# Patient Record
Sex: FEMALE | Race: WHITE | HISPANIC OR LATINO | Employment: FULL TIME | URBAN - METROPOLITAN AREA
[De-identification: names, ages, dates, MRNs, and addresses within clinical notes are randomized per-mention and may not be internally consistent; named-entity substitution may affect disease eponyms.]

---

## 2021-02-04 ENCOUNTER — OFFICE VISIT (OUTPATIENT)
Dept: FAMILY MEDICINE CLINIC | Facility: CLINIC | Age: 31
End: 2021-02-04

## 2021-02-04 VITALS
RESPIRATION RATE: 14 BRPM | DIASTOLIC BLOOD PRESSURE: 70 MMHG | OXYGEN SATURATION: 99 % | WEIGHT: 135 LBS | SYSTOLIC BLOOD PRESSURE: 122 MMHG | BODY MASS INDEX: 24.84 KG/M2 | HEART RATE: 63 BPM | HEIGHT: 62 IN

## 2021-02-04 DIAGNOSIS — N64.89 BILATERAL PENDULOUS BREASTS: Primary | ICD-10-CM

## 2021-02-04 PROCEDURE — 99203 OFFICE O/P NEW LOW 30 MIN: CPT | Performed by: NURSE PRACTITIONER

## 2021-02-04 NOTE — PATIENT INSTRUCTIONS
Breast Reduction  Breast reduction, also called reduction mammoplasty, is surgery to reduce the size of the breasts by removing fat, tissue, and excess skin. The goal of this procedure is to help relieve problems that are caused or made worse by large breasts, such as:  · Poor posture.  · Long-term (chronic) back and neck pain.  · Difficulty exercising.  · A rash on the skin under the breasts.  · Breast pain.  · Grooves in the shoulder from bra straps.  · Difficulty with hygiene.  · Psychological distress caused by large breasts.  Before the procedure, you and your surgeon will decide on a plan for the new size of your breasts.  Tell a health care provider about:  · Any allergies you have.  · All medicines you are taking, including vitamins, herbs, eye drops, creams, and over-the-counter medicines.  · Any problems you or family members have had with anesthetic medicines.  · Any blood disorders you have.  · Any surgeries you have had.  · Any medical conditions you have.  · Whether you are pregnant or may be pregnant.  What are the risks?  Generally, this is a safe procedure. However, problems may occur, including:  · Infection.  · Bleeding. Blood may pool near the incision area (hematoma).  · Allergic reactions to medicines.  · Damage to nearby structures or organs, such as the dark area around the nipple (areola).  · Partial or total numbness in the nipple or breast. Sometimes feeling returns, but not always.  · Inability to breastfeed.  · Pneumonia.  · Blood clots.  What happens before the procedure?  Staying hydrated  Follow instructions from your health care provider about hydration, which may include:  · Up to 2 hours before the procedure - you may continue to drink clear liquids, such as water, clear fruit juice, black coffee, and plain tea.    Eating and drinking restrictions  Follow instructions from your health care provider about eating and drinking, which may include:  · 8 hours before the procedure - stop  eating heavy meals or foods, such as meat, fried foods, or fatty foods.  · 6 hours before the procedure - stop eating light meals or foods, such as toast or cereal.  · 6 hours before the procedure - stop drinking milk or drinks that contain milk.  · 2 hours before the procedure - stop drinking clear liquids.  Medicines  Ask your health care provider about:  · Changing or stopping your regular medicines. This is especially important if you are taking diabetes medicines or blood thinners.  · Taking medicines such as aspirin and ibuprofen. These medicines can thin your blood. Do not take these medicines unless your health care provider tells you to take them.  · Taking other over-the-counter medicines, vitamins, herbs, and supplements.  Tests  You may have tests or exams, such as:  · Blood tests.  · An X-ray exam of the breasts (mammogram).  General instructions  · Ask your health care provider:  ? How your surgery site will be marked.  ? What steps will be taken to help prevent infection. These may include:  § Washing skin with a germ-killing soap.  § Taking antibiotic medicine.  · You may be asked to bathe using a germ-killing soap before your procedure.  · Do not use any products that contain nicotine or tobacco for at least 4 weeks before the procedure. These products include cigarettes, e-cigarettes, and chewing tobacco. If you need help quitting, ask your health care provider.  · Plan to have someone take you home from the hospital or clinic.  · Plan to have a responsible adult care for you for at least 24 hours after you leave the hospital or clinic. This is important.  What happens during the procedure?  · An IV will be inserted into one of your veins.  · You will be given one or more of the following:  ? A medicine to help you relax (sedative).  ? A medicine to numb the area (local anesthetic).  ? A medicine to make you fall asleep (general anesthetic).  · An incision will be made in each breast.  · Fat,  tissue, and excess skin will be removed from each breast.  · Your breasts will be reshaped. Your nipples may be moved so they are centered on your smaller breasts.  · Tubes may be placed in your breast tissue to drain fluid from your surgical area.  · Your incisions will be closed with stitches (sutures) and covered with surgical tape. Your breasts will be covered with gauze and elastic bandages (dressings).  The procedure may vary among health care providers and hospitals.  What happens after the procedure?  · Your blood pressure, heart rate, breathing rate, and blood oxygen level will be monitored until you leave the hospital or clinic.  · You may continue to receive fluids and medicines through an IV.  · You may have to wear compression stockings. These stockings help to prevent blood clots and reduce swelling in your legs.  · You may continue to have tubes draining fluid from your surgical area. The tubes will be removed 2-3 days after the surgery.  · You may be given a certain type of bra to wear.  · You may have breast pain. You will be given medicine to help relieve pain.  · Do not drive for 24 hours if you were given a sedative during the procedure.  Summary  · Breast reduction, also called reduction mammoplasty, is surgery to reduce the size of the breasts by removing fat, tissue, and excess skin.  · Tubes may be placed in your breast tissue to drain fluid from your surgical area. The tubes will be removed 2-3 days after the surgery.  · Your incisions will be closed with stitches (sutures) and covered with surgical tape. Your breasts will be covered with gauze and elastic bandages (dressings).  · You may have breast pain after the procedure. You will be given medicine to help relieve pain.  · Plan to have someone take you home from the hospital or clinic.  This information is not intended to replace advice given to you by your health care provider. Make sure you discuss any questions you have with your  health care provider.  Document Revised: 06/19/2020 Document Reviewed: 06/19/2020  Elsevier Patient Education © 2020 [employer]

## 2021-02-04 NOTE — PROGRESS NOTES
Subjective   Minor Adan is a 30 y.o. female.   Back Pain, Neck Pain, and Shoulder Pain    History of Present Illness   Is having pain to her back, neck and shoulder pain for a long time.She also has some left breast pain when she rolls over onto her side.She has had pain for a very long time. She takes ibuprofen with mild relief. She exercises and has a constant rash to bottom of breasts.  She wears a double D bra and has pendulous breasts    The following portions of the patient's history were reviewed and updated as appropriate: allergies, current medications, past family history, past medical history, past social history, past surgical history and problem list.    Review of Systems   Constitutional: Negative for activity change, appetite change and fatigue.   Respiratory: Negative for shortness of breath.    Musculoskeletal: Positive for back pain and neck pain.       Objective   Physical Exam  Vitals signs and nursing note reviewed.   Constitutional:       Appearance: She is well-developed.   HENT:      Head: Normocephalic and atraumatic.   Eyes:      Pupils: Pupils are equal, round, and reactive to light.   Pulmonary:      Effort: Pulmonary effort is normal.   Musculoskeletal: Normal range of motion.         General: Tenderness present.      Comments: She has constant tenderness to her trapezius muscles bilaterally and to her para spinous muscles.   Skin:     General: Skin is warm and dry.   Neurological:      Mental Status: She is alert and oriented to person, place, and time.           Assessment/Plan   Problem List Items Addressed This Visit     None      Visit Diagnoses     Bilateral pendulous breasts    -  Primary    Relevant Orders    Ambulatory Referral to Plastic Surgery (Completed)        I firmly believe that the neck and back pain are from the size of her breasts. I talked with Dr. Curran and she will be referred to plastics. Dr Rodriguez.         No follow-ups on file.

## 2021-02-11 ENCOUNTER — TELEPHONE (OUTPATIENT)
Dept: FAMILY MEDICINE CLINIC | Facility: CLINIC | Age: 31
End: 2021-02-11

## 2021-02-11 NOTE — TELEPHONE ENCOUNTER
Patient called to follow up on referral and she called the plastic surgeon office and they told her they do not deal with INS and was supposed to contact the office. Please advise at [phone].

## 2021-02-24 ENCOUNTER — TELEPHONE (OUTPATIENT)
Dept: FAMILY MEDICINE CLINIC | Facility: CLINIC | Age: 31
End: 2021-02-24

## 2021-02-24 NOTE — TELEPHONE ENCOUNTER
----- Message from Opal Nguyen sent at 2/24/2021  2:31 PM EST -----  Regarding: FW: Complaint  Contact: [phone]    ----- Message -----  From: Sulema Narayan MA  Sent: 2/24/2021  11:16 AM EST  To: Opal Nguyen  Subject: FW: Complaint                                      ----- Message -----  From: Minor Adan  Sent: 2/24/2021  11:00 AM EST  [facility]  Subject: RE: Complaint                                    I understand. I am more than willing to be patient if the expectations are set. I was told id receive a call the next day the last time I spoke to Opal... its been almost a week.     I'd appreciate a call from the referral person.

## 2021-04-16 ENCOUNTER — BULK ORDERING (OUTPATIENT)
Dept: CASE MANAGEMENT | Facility: OTHER | Age: 31
End: 2021-04-16

## 2021-04-16 DIAGNOSIS — Z23 IMMUNIZATION DUE: ICD-10-CM

## 2021-12-07 ENCOUNTER — PRE-ADMISSION TESTING (OUTPATIENT)
Dept: PREADMISSION TESTING | Facility: HOSPITAL | Age: 31
End: 2021-12-07

## 2021-12-07 VITALS
WEIGHT: 130.2 LBS | SYSTOLIC BLOOD PRESSURE: 117 MMHG | DIASTOLIC BLOOD PRESSURE: 67 MMHG | HEIGHT: 62 IN | TEMPERATURE: 98.1 F | OXYGEN SATURATION: 100 % | RESPIRATION RATE: 16 BRPM | HEART RATE: 60 BPM | BODY MASS INDEX: 23.96 KG/M2

## 2021-12-07 LAB
DEPRECATED RDW RBC AUTO: 41.6 FL (ref 37–54)
ERYTHROCYTE [DISTWIDTH] IN BLOOD BY AUTOMATED COUNT: 12.2 % (ref 12.3–15.4)
HCT VFR BLD AUTO: 40 % (ref 34–46.6)
HGB BLD-MCNC: 13.9 G/DL (ref 12–15.9)
MCH RBC QN AUTO: 32.6 PG (ref 26.6–33)
MCHC RBC AUTO-ENTMCNC: 34.8 G/DL (ref 31.5–35.7)
MCV RBC AUTO: 93.7 FL (ref 79–97)
PLATELET # BLD AUTO: 268 10*3/MM3 (ref 140–450)
PMV BLD AUTO: 9.2 FL (ref 6–12)
RBC # BLD AUTO: 4.27 10*6/MM3 (ref 3.77–5.28)
WBC NRBC COR # BLD: 5.8 10*3/MM3 (ref 3.4–10.8)

## 2021-12-07 PROCEDURE — 85027 COMPLETE CBC AUTOMATED: CPT

## 2021-12-07 PROCEDURE — 36415 COLL VENOUS BLD VENIPUNCTURE: CPT

## 2021-12-07 NOTE — DISCHARGE INSTRUCTIONS
8Take the following medications the morning of surgery:    NONE     If you are on prescription narcotic pain medication to control your pain you may also take that medication the morning of surgery.    General Instructions:  • Do not eat solid food after midnight the night before surgery.  • You may drink clear liquids day of surgery but must stop at least one hour before your hospital arrival time.  • It is beneficial for you to have a clear drink that contains carbohydrates the day of surgery.  We suggest a 12 to 20 ounce bottle of Gatorade or Powerade for non-diabetic patients     Clear liquids are liquids you can see through.  Nothing red in color.     Plain water                               Sports drinks  Sodas                                   Gelatin (Jell-O)  Fruit juices without pulp such as white grape juice and apple juice  Popsicles that contain no fruit or yogurt  Tea or coffee (no cream or milk added)  Gatorade / Powerade  G2 / Powerade Zero    • Patients who avoid smoking, chewing tobacco and alcohol for 4 weeks prior to surgery have a reduced risk of post-operative complications.    • Do not smoke, use chewing tobacco or drink alcohol the day of surgery.   • Bring any papers given to you in the doctor’s office.  • Wear clean comfortable clothes.  • Do not wear contact lenses, false eyelashes or make-up.  Bring a case for your glasses.   • Remove all piercings.  Leave jewelry and any other valuables at home.  • Hair extensions with metal clips must be removed prior to surgery.  • The Pre-Admission Testing nurse will instruct you to bring medications if unable to obtain an accurate list in Pre-Admission Testing.   • REPORT TO SURGERY  ENTRANCE  ON 12- AT 0530 AM        Preventing a Surgical Site Infection:  • For 2 to 3 days before surgery, avoid shaving with a razor because the razor can irritate skin and make it easier to develop an infection.    • Any areas of open skin can increase the  risk of a post-operative wound infection by allowing bacteria to enter and travel throughout the body.  Notify your surgeon if you have any skin wounds / rashes even if it is not near the expected surgical site.  The area will need assessed to determine if surgery should be delayed until it is healed.  • The night prior to surgery shower using a fresh bar of anti-bacterial soap (such as Dial) and clean washcloth.  Sleep in a clean bed with clean clothing.  Do not allow pets to sleep with you.  • Shower on the morning of surgery using a fresh bar of anti-bacterial soap (such as Dial) and clean washcloth.  Dry with a clean towel and dress in clean clothing.  • Ask your surgeon if you will be receiving antibiotics prior to surgery.  • Make sure you, your family, and all healthcare providers clean their hands with soap and water or an alcohol based hand  before caring for you or your wound.    Day of surgery:  Your arrival time is approximately two hours before your scheduled surgery time.  Upon arrival, a Pre-op nurse and Anesthesiologist will review your health history, obtain vital signs, and answer questions you may have.  The only belongings needed at this time will be a list of your home medications and if applicable your C-PAP/BI-PAP machine.  A Pre-op nurse will start an IV and you may receive medication in preparation for surgery, including something to help you relax.     Please be aware that surgery does come with discomfort.  We want to make every effort to control your discomfort so please discuss any uncontrolled symptoms with your nurse.   Your doctor will most likely have prescribed pain medications.      If you are going home after surgery you will receive individualized written care instructions before being discharged.  A responsible adult must drive you to and from the hospital on the day of your surgery and stay with you for 24 hours.  Discharge prescriptions can be filled by the hospital  pharmacy during regular pharmacy hours.  If you are having surgery late in the day/evening your prescription may be e-prescribed to your pharmacy.  Please verify your pharmacy hours or chose a 24 hour pharmacy to avoid not having access to your prescription because your pharmacy has closed for the day.        If you have any questions please call Pre-Admission Testing at [phone].  Deductibles and co-payments are collected on the day of service. Please be prepared to pay the required co-pay, deductible or deposit on the day of service as defined by your plan.    Patient Education for Self-Quarantine Process    • Following your COVID testing, we strongly recommend that you wear a mask when you are with other people and practice social distancing.   • Limit your activities to only required outings.  • Wash your hands with soap and water frequently for at least 20 seconds.   • Avoid touching your eyes, nose and mouth with unwashed hands.  • Do not share anything - utensils, drinking glasses, food from the same bowl.   • Sanitize household surfaces daily. Include all high touch areas (door handles, light switches, phones, countertops, etc.)    Call your surgeon immediately if you experience any of the following symptoms:  • Sore Throat  • Shortness of Breath or difficulty breathing  • Cough  • Chills  • Body soreness or muscle pain  • Headache  • Fever  • New loss of taste or smell  • Do not arrive for your surgery ill.  Your procedure will need to be rescheduled to another time.  You will need to call your physician before the day of surgery to avoid any unnecessary exposure to hospital staff as well as other patients.

## 2021-12-27 ENCOUNTER — LAB (OUTPATIENT)
Dept: LAB | Facility: HOSPITAL | Age: 31
End: 2021-12-27

## 2021-12-27 LAB — SARS-COV-2 ORF1AB RESP QL NAA+PROBE: DETECTED

## 2021-12-27 PROCEDURE — U0004 COV-19 TEST NON-CDC HGH THRU: HCPCS

## 2021-12-27 PROCEDURE — C9803 HOPD COVID-19 SPEC COLLECT: HCPCS

## 2021-12-28 ENCOUNTER — APPOINTMENT (OUTPATIENT)
Dept: LAB | Facility: HOSPITAL | Age: 31
End: 2021-12-28

## 2021-12-28 RX ORDER — CEFAZOLIN SODIUM 2 G/100ML
2 INJECTION, SOLUTION INTRAVENOUS ONCE
Status: COMPLETED | OUTPATIENT
Start: 2021-12-29 | End: 2021-12-29

## 2021-12-29 ENCOUNTER — ANESTHESIA EVENT (OUTPATIENT)
Dept: PERIOP | Facility: HOSPITAL | Age: 31
End: 2021-12-29

## 2021-12-29 ENCOUNTER — ANESTHESIA (OUTPATIENT)
Dept: PERIOP | Facility: HOSPITAL | Age: 31
End: 2021-12-29

## 2021-12-29 ENCOUNTER — HOSPITAL ENCOUNTER (OUTPATIENT)
Facility: HOSPITAL | Age: 31
Setting detail: HOSPITAL OUTPATIENT SURGERY
Discharge: HOME | End: 2021-12-29
Attending: SPECIALIST | Admitting: SPECIALIST

## 2021-12-29 VITALS
RESPIRATION RATE: 16 BRPM | DIASTOLIC BLOOD PRESSURE: 79 MMHG | TEMPERATURE: 97.8 F | HEART RATE: 94 BPM | SYSTOLIC BLOOD PRESSURE: 125 MMHG | OXYGEN SATURATION: 99 %

## 2021-12-29 DIAGNOSIS — N64.89 DEFORMITY OF BREAST: ICD-10-CM

## 2021-12-29 DIAGNOSIS — N64.2 BREAST SIZE DECREASE: Primary | ICD-10-CM

## 2021-12-29 LAB
B-HCG UR QL: NEGATIVE
B-HCG UR QL: NEGATIVE
EXPIRATION DATE: NORMAL
EXPIRATION DATE: NORMAL
INTERNAL NEGATIVE CONTROL: NEGATIVE
INTERNAL NEGATIVE CONTROL: NEGATIVE
INTERNAL POSITIVE CONTROL: NORMAL
INTERNAL POSITIVE CONTROL: POSITIVE
Lab: NORMAL
Lab: NORMAL

## 2021-12-29 PROCEDURE — 25010000002 ONDANSETRON PER 1 MG: Performed by: NURSE ANESTHETIST, CERTIFIED REGISTERED

## 2021-12-29 PROCEDURE — 0 CEFAZOLIN PER 500 MG: Performed by: SPECIALIST

## 2021-12-29 PROCEDURE — 25010000002 DEXAMETHASONE PER 1 MG: Performed by: NURSE ANESTHETIST, CERTIFIED REGISTERED

## 2021-12-29 PROCEDURE — 25010000002 FENTANYL CITRATE (PF) 50 MCG/ML SOLUTION: Performed by: NURSE ANESTHETIST, CERTIFIED REGISTERED

## 2021-12-29 PROCEDURE — 25010000002 NEOSTIGMINE 5 MG/10ML SOLUTION: Performed by: NURSE ANESTHETIST, CERTIFIED REGISTERED

## 2021-12-29 PROCEDURE — 0 CEFAZOLIN IN DEXTROSE 2-4 GM/100ML-% SOLUTION: Performed by: SPECIALIST

## 2021-12-29 PROCEDURE — 88305 TISSUE EXAM BY PATHOLOGIST: CPT | Performed by: SPECIALIST

## 2021-12-29 PROCEDURE — 25010000002 HYDROMORPHONE PER 4 MG: Performed by: NURSE ANESTHETIST, CERTIFIED REGISTERED

## 2021-12-29 PROCEDURE — 81025 URINE PREGNANCY TEST: CPT | Performed by: SPECIALIST

## 2021-12-29 PROCEDURE — 25010000002 PROPOFOL 10 MG/ML EMULSION: Performed by: NURSE ANESTHETIST, CERTIFIED REGISTERED

## 2021-12-29 RX ORDER — PROPOFOL 10 MG/ML
VIAL (ML) INTRAVENOUS AS NEEDED
Status: DISCONTINUED | OUTPATIENT
Start: 2021-12-29 | End: 2021-12-29 | Stop reason: SURG

## 2021-12-29 RX ORDER — SCOLOPAMINE TRANSDERMAL SYSTEM 1 MG/1
1 PATCH, EXTENDED RELEASE TRANSDERMAL ONCE
Status: DISCONTINUED | OUTPATIENT
Start: 2021-12-29 | End: 2021-12-29 | Stop reason: HOSPADM

## 2021-12-29 RX ORDER — FENTANYL CITRATE 50 UG/ML
50 INJECTION, SOLUTION INTRAMUSCULAR; INTRAVENOUS
Status: DISCONTINUED | OUTPATIENT
Start: 2021-12-29 | End: 2021-12-29 | Stop reason: HOSPADM

## 2021-12-29 RX ORDER — OXYCODONE AND ACETAMINOPHEN 7.5; 325 MG/1; MG/1
1 TABLET ORAL EVERY 4 HOURS PRN
Status: DISCONTINUED | OUTPATIENT
Start: 2021-12-29 | End: 2021-12-29 | Stop reason: HOSPADM

## 2021-12-29 RX ORDER — DEXAMETHASONE SODIUM PHOSPHATE 10 MG/ML
INJECTION INTRAMUSCULAR; INTRAVENOUS AS NEEDED
Status: DISCONTINUED | OUTPATIENT
Start: 2021-12-29 | End: 2021-12-29 | Stop reason: SURG

## 2021-12-29 RX ORDER — FLUMAZENIL 0.1 MG/ML
0.2 INJECTION INTRAVENOUS AS NEEDED
Status: DISCONTINUED | OUTPATIENT
Start: 2021-12-29 | End: 2021-12-29 | Stop reason: HOSPADM

## 2021-12-29 RX ORDER — NEOSTIGMINE METHYLSULFATE 0.5 MG/ML
INJECTION, SOLUTION INTRAVENOUS AS NEEDED
Status: DISCONTINUED | OUTPATIENT
Start: 2021-12-29 | End: 2021-12-29 | Stop reason: SURG

## 2021-12-29 RX ORDER — GLYCOPYRROLATE 0.2 MG/ML
INJECTION INTRAMUSCULAR; INTRAVENOUS AS NEEDED
Status: DISCONTINUED | OUTPATIENT
Start: 2021-12-29 | End: 2021-12-29 | Stop reason: SURG

## 2021-12-29 RX ORDER — LIDOCAINE HYDROCHLORIDE 10 MG/ML
0.5 INJECTION, SOLUTION EPIDURAL; INFILTRATION; INTRACAUDAL; PERINEURAL ONCE AS NEEDED
Status: DISCONTINUED | OUTPATIENT
Start: 2021-12-29 | End: 2021-12-29 | Stop reason: HOSPADM

## 2021-12-29 RX ORDER — DIPHENHYDRAMINE HYDROCHLORIDE 50 MG/ML
12.5 INJECTION INTRAMUSCULAR; INTRAVENOUS
Status: DISCONTINUED | OUTPATIENT
Start: 2021-12-29 | End: 2021-12-29 | Stop reason: HOSPADM

## 2021-12-29 RX ORDER — ONDANSETRON 2 MG/ML
INJECTION INTRAMUSCULAR; INTRAVENOUS AS NEEDED
Status: DISCONTINUED | OUTPATIENT
Start: 2021-12-29 | End: 2021-12-29 | Stop reason: SURG

## 2021-12-29 RX ORDER — PROMETHAZINE HYDROCHLORIDE 25 MG/1
25 SUPPOSITORY RECTAL ONCE AS NEEDED
Status: DISCONTINUED | OUTPATIENT
Start: 2021-12-29 | End: 2021-12-29 | Stop reason: HOSPADM

## 2021-12-29 RX ORDER — SODIUM CHLORIDE, SODIUM LACTATE, POTASSIUM CHLORIDE, CALCIUM CHLORIDE 600; 310; 30; 20 MG/100ML; MG/100ML; MG/100ML; MG/100ML
9 INJECTION, SOLUTION INTRAVENOUS CONTINUOUS
Status: DISCONTINUED | OUTPATIENT
Start: 2021-12-29 | End: 2021-12-29 | Stop reason: HOSPADM

## 2021-12-29 RX ORDER — DIPHENHYDRAMINE HCL 25 MG
25 CAPSULE ORAL
Status: DISCONTINUED | OUTPATIENT
Start: 2021-12-29 | End: 2021-12-29 | Stop reason: HOSPADM

## 2021-12-29 RX ORDER — IBUPROFEN 600 MG/1
600 TABLET ORAL ONCE AS NEEDED
Status: DISCONTINUED | OUTPATIENT
Start: 2021-12-29 | End: 2021-12-29 | Stop reason: HOSPADM

## 2021-12-29 RX ORDER — EPHEDRINE SULFATE 50 MG/ML
5 INJECTION, SOLUTION INTRAVENOUS ONCE AS NEEDED
Status: DISCONTINUED | OUTPATIENT
Start: 2021-12-29 | End: 2021-12-29 | Stop reason: HOSPADM

## 2021-12-29 RX ORDER — FENTANYL CITRATE 50 UG/ML
INJECTION, SOLUTION INTRAMUSCULAR; INTRAVENOUS AS NEEDED
Status: DISCONTINUED | OUTPATIENT
Start: 2021-12-29 | End: 2021-12-29 | Stop reason: SURG

## 2021-12-29 RX ORDER — HYDRALAZINE HYDROCHLORIDE 20 MG/ML
5 INJECTION INTRAMUSCULAR; INTRAVENOUS
Status: DISCONTINUED | OUTPATIENT
Start: 2021-12-29 | End: 2021-12-29 | Stop reason: HOSPADM

## 2021-12-29 RX ORDER — SODIUM CHLORIDE 0.9 % (FLUSH) 0.9 %
3-10 SYRINGE (ML) INJECTION AS NEEDED
Status: DISCONTINUED | OUTPATIENT
Start: 2021-12-29 | End: 2021-12-29 | Stop reason: HOSPADM

## 2021-12-29 RX ORDER — ROCURONIUM BROMIDE 10 MG/ML
INJECTION, SOLUTION INTRAVENOUS AS NEEDED
Status: DISCONTINUED | OUTPATIENT
Start: 2021-12-29 | End: 2021-12-29 | Stop reason: SURG

## 2021-12-29 RX ORDER — LABETALOL HYDROCHLORIDE 5 MG/ML
5 INJECTION, SOLUTION INTRAVENOUS
Status: DISCONTINUED | OUTPATIENT
Start: 2021-12-29 | End: 2021-12-29 | Stop reason: HOSPADM

## 2021-12-29 RX ORDER — ONDANSETRON 2 MG/ML
4 INJECTION INTRAMUSCULAR; INTRAVENOUS ONCE AS NEEDED
Status: COMPLETED | OUTPATIENT
Start: 2021-12-29 | End: 2021-12-29

## 2021-12-29 RX ORDER — HYDROCODONE BITARTRATE AND ACETAMINOPHEN 7.5; 325 MG/1; MG/1
1 TABLET ORAL ONCE AS NEEDED
Status: DISCONTINUED | OUTPATIENT
Start: 2021-12-29 | End: 2021-12-29 | Stop reason: HOSPADM

## 2021-12-29 RX ORDER — HYDROMORPHONE HCL 110MG/55ML
PATIENT CONTROLLED ANALGESIA SYRINGE INTRAVENOUS AS NEEDED
Status: DISCONTINUED | OUTPATIENT
Start: 2021-12-29 | End: 2021-12-29 | Stop reason: SURG

## 2021-12-29 RX ORDER — HYDROMORPHONE HYDROCHLORIDE 1 MG/ML
0.5 INJECTION, SOLUTION INTRAMUSCULAR; INTRAVENOUS; SUBCUTANEOUS
Status: DISCONTINUED | OUTPATIENT
Start: 2021-12-29 | End: 2021-12-29 | Stop reason: HOSPADM

## 2021-12-29 RX ORDER — LIDOCAINE HYDROCHLORIDE 20 MG/ML
INJECTION, SOLUTION INFILTRATION; PERINEURAL AS NEEDED
Status: DISCONTINUED | OUTPATIENT
Start: 2021-12-29 | End: 2021-12-29 | Stop reason: SURG

## 2021-12-29 RX ORDER — PROMETHAZINE HYDROCHLORIDE 25 MG/1
25 TABLET ORAL ONCE AS NEEDED
Status: DISCONTINUED | OUTPATIENT
Start: 2021-12-29 | End: 2021-12-29 | Stop reason: HOSPADM

## 2021-12-29 RX ORDER — SODIUM CHLORIDE 0.9 % (FLUSH) 0.9 %
3 SYRINGE (ML) INJECTION EVERY 12 HOURS SCHEDULED
Status: DISCONTINUED | OUTPATIENT
Start: 2021-12-29 | End: 2021-12-29 | Stop reason: HOSPADM

## 2021-12-29 RX ORDER — DOCUSATE SODIUM 250 MG
250 CAPSULE ORAL 2 TIMES DAILY PRN
Qty: 30 CAPSULE | Refills: 0 | Status: SHIPPED | OUTPATIENT
Start: 2021-12-29 | End: 2022-07-13

## 2021-12-29 RX ORDER — BUPIVACAINE HYDROCHLORIDE AND EPINEPHRINE 5; 5 MG/ML; UG/ML
INJECTION, SOLUTION EPIDURAL; INTRACAUDAL; PERINEURAL AS NEEDED
Status: DISCONTINUED | OUTPATIENT
Start: 2021-12-29 | End: 2021-12-29 | Stop reason: HOSPADM

## 2021-12-29 RX ORDER — ONDANSETRON 4 MG/1
4 TABLET, FILM COATED ORAL EVERY 6 HOURS PRN
Qty: 30 TABLET | Refills: 1 | Status: SHIPPED | OUTPATIENT
Start: 2021-12-29 | End: 2022-07-13

## 2021-12-29 RX ORDER — NALOXONE HCL 0.4 MG/ML
0.2 VIAL (ML) INJECTION AS NEEDED
Status: DISCONTINUED | OUTPATIENT
Start: 2021-12-29 | End: 2021-12-29 | Stop reason: HOSPADM

## 2021-12-29 RX ORDER — OXYCODONE AND ACETAMINOPHEN 7.5; 325 MG/1; MG/1
1 TABLET ORAL EVERY 4 HOURS PRN
Qty: 28 TABLET | Refills: 0 | Status: SHIPPED | OUTPATIENT
Start: 2021-12-29 | End: 2022-07-13

## 2021-12-29 RX ORDER — FAMOTIDINE 10 MG/ML
20 INJECTION, SOLUTION INTRAVENOUS ONCE
Status: COMPLETED | OUTPATIENT
Start: 2021-12-29 | End: 2021-12-29

## 2021-12-29 RX ORDER — MIDAZOLAM HYDROCHLORIDE 1 MG/ML
1 INJECTION INTRAMUSCULAR; INTRAVENOUS
Status: DISCONTINUED | OUTPATIENT
Start: 2021-12-29 | End: 2021-12-29 | Stop reason: HOSPADM

## 2021-12-29 RX ADMIN — FENTANYL CITRATE 50 MCG: 50 INJECTION INTRAMUSCULAR; INTRAVENOUS at 11:55

## 2021-12-29 RX ADMIN — ONDANSETRON 4 MG: 2 INJECTION INTRAMUSCULAR; INTRAVENOUS at 11:25

## 2021-12-29 RX ADMIN — PROPOFOL 50 MG: 10 INJECTION, EMULSION INTRAVENOUS at 08:02

## 2021-12-29 RX ADMIN — SODIUM CHLORIDE, POTASSIUM CHLORIDE, SODIUM LACTATE AND CALCIUM CHLORIDE: 600; 310; 30; 20 INJECTION, SOLUTION INTRAVENOUS at 08:56

## 2021-12-29 RX ADMIN — PROPOFOL 150 MG: 10 INJECTION, EMULSION INTRAVENOUS at 08:01

## 2021-12-29 RX ADMIN — HYDROMORPHONE HYDROCHLORIDE 0.5 MG: 2 INJECTION, SOLUTION INTRAMUSCULAR; INTRAVENOUS; SUBCUTANEOUS at 09:12

## 2021-12-29 RX ADMIN — CEFAZOLIN SODIUM 2 G: 2 INJECTION, SOLUTION INTRAVENOUS at 07:48

## 2021-12-29 RX ADMIN — ROCURONIUM BROMIDE 40 MG: 50 INJECTION INTRAVENOUS at 08:01

## 2021-12-29 RX ADMIN — FENTANYL CITRATE 100 MCG: 0.05 INJECTION, SOLUTION INTRAMUSCULAR; INTRAVENOUS at 08:35

## 2021-12-29 RX ADMIN — ONDANSETRON 4 MG: 2 INJECTION INTRAMUSCULAR; INTRAVENOUS at 12:22

## 2021-12-29 RX ADMIN — ROCURONIUM BROMIDE 10 MG: 50 INJECTION INTRAVENOUS at 10:41

## 2021-12-29 RX ADMIN — NEOSTIGMINE METHYLSULFATE 3 MG: 0.5 INJECTION INTRAVENOUS at 11:29

## 2021-12-29 RX ADMIN — ROCURONIUM BROMIDE 20 MG: 50 INJECTION INTRAVENOUS at 08:35

## 2021-12-29 RX ADMIN — LIDOCAINE HYDROCHLORIDE 100 MG: 20 INJECTION, SOLUTION INFILTRATION; PERINEURAL at 08:01

## 2021-12-29 RX ADMIN — FENTANYL CITRATE 100 MCG: 0.05 INJECTION, SOLUTION INTRAMUSCULAR; INTRAVENOUS at 08:01

## 2021-12-29 RX ADMIN — OXYCODONE AND ACETAMINOPHEN 1 TABLET: 7.5; 325 TABLET ORAL at 12:22

## 2021-12-29 RX ADMIN — DEXAMETHASONE SODIUM PHOSPHATE 8 MG: 10 INJECTION INTRAMUSCULAR; INTRAVENOUS at 08:06

## 2021-12-29 RX ADMIN — SCOPALAMINE 1 PATCH: 1 PATCH, EXTENDED RELEASE TRANSDERMAL at 06:33

## 2021-12-29 RX ADMIN — FAMOTIDINE 20 MG: 10 INJECTION INTRAVENOUS at 06:32

## 2021-12-29 RX ADMIN — GLYCOPYRROLATE 0.4 MG: 0.2 INJECTION INTRAMUSCULAR; INTRAVENOUS at 11:29

## 2021-12-29 RX ADMIN — SODIUM CHLORIDE, POTASSIUM CHLORIDE, SODIUM LACTATE AND CALCIUM CHLORIDE 9 ML/HR: 600; 310; 30; 20 INJECTION, SOLUTION INTRAVENOUS at 06:33

## 2021-12-29 NOTE — ANESTHESIA PROCEDURE NOTES
Airway  Urgency: elective    Date/Time: 12/29/2021 8:02 AM  Airway not difficult    General Information and Staff    Patient location during procedure: OR  Anesthesiologist: Jony Sanford MD  CRNA: Chela Valencia CRNA    Indications and Patient Condition  Indications for airway management: airway protection    Preoxygenated: yes  MILS not maintained throughout  Mask difficulty assessment: 1 - vent by mask    Final Airway Details  Final airway type: endotracheal airway      Successful airway: ETT  Cuffed: yes   Successful intubation technique: direct laryngoscopy  Endotracheal tube insertion site: oral  Blade: Lorenzo  Blade size: 3  ETT size (mm): 7.0  Cormack-Lehane Classification: grade I - full view of glottis  Placement verified by: chest auscultation and capnometry   Cuff volume (mL): 7  Measured from: lips  ETT/EBT  to lips (cm): 21  Number of attempts at approach: 1  Assessment: lips, teeth, and gum same as pre-op and atraumatic intubation    Additional Comments  Pt preoxygenated prior to induction, easy mask airway, atraumatic intubation,+ ETCO2, + bs bilat,  ETT secured and connected to ventilator.

## 2021-12-29 NOTE — ANESTHESIA POSTPROCEDURE EVALUATION
Patient: Minor Escamilla Antionette    Procedure Summary     Date: 12/29/21 Room / Location: [facility] OR [address] [place] [address] MAIN OR    Anesthesia Start: 0751 Anesthesia Stop: 1148    Procedure: BREAST REDUCTION BILATERAL (Bilateral Chest) Diagnosis:     Surgeons: Mane Morales Jr., MD Provider: Jony Sanford MD    Anesthesia Type: general ASA Status: 1          Anesthesia Type: general    Vitals  Vitals Value Taken Time   /79 12/29/21 1316   Temp 36.6 °C (97.8 °F) 12/29/21 1145   Pulse 91 12/29/21 1317   Resp 16 12/29/21 1250   SpO2 98 % 12/29/21 1318   Vitals shown include unvalidated device data.        Post Anesthesia Care and Evaluation    Patient location during evaluation: PHASE II  Patient participation: complete - patient participated  Level of consciousness: awake and alert  Pain management: adequate  Airway patency: patent  Anesthetic complications: No anesthetic complications    Cardiovascular status: acceptable  Respiratory status: acceptable  Hydration status: acceptable    Comments: /89   Pulse 69   Temp 36.6 °C (97.8 °F) (Oral)   Resp 16   LMP 12/03/2021   SpO2 98%

## 2021-12-29 NOTE — ANESTHESIA PREPROCEDURE EVALUATION
Anesthesia Evaluation     Patient summary reviewed and Nursing notes reviewed   no history of anesthetic complications:  NPO Solid Status: > 8 hours  NPO Liquid Status: > 4 hours           Airway   Mallampati: I  Dental - normal exam     Pulmonary - negative pulmonary ROS and normal exam   Cardiovascular - negative cardio ROS and normal exam        Neuro/Psych- negative ROS  GI/Hepatic/Renal/Endo - negative ROS     Musculoskeletal     Abdominal    Substance History      OB/GYN          Other                        Anesthesia Plan    ASA 1     general     intravenous induction     Anesthetic plan, all risks, benefits, and alternatives have been provided, discussed and informed consent has been obtained with: patient.

## 2021-12-30 LAB
LAB AP CASE REPORT: NORMAL
PATH REPORT.FINAL DX SPEC: NORMAL
PATH REPORT.GROSS SPEC: NORMAL

## 2022-04-27 ENCOUNTER — TELEPHONE (OUTPATIENT)
Dept: FAMILY MEDICINE CLINIC | Facility: CLINIC | Age: 32
End: 2022-04-27

## 2022-04-27 NOTE — TELEPHONE ENCOUNTER
Caller: Minor Hardin    Relationship: Self    Best call back number: [phone]    What orders are you requesting (i.e. lab or imaging):   1.HIV ANTIGEN/ANTIBODY COMBO WITH REFLEX TO CONFIRM  2. HBSAG SCREEN VIEW  3. HCT QUAL  4. RPR, RFX QN RPR/CONFIRM    In what timeframe would the patient need to come in: ASAP    Where will you receive your lab/imaging services: OUR OFFICE    Additional notes: PATIENT NEEDS THESE DONE PER HER WIFE'S IVF DR (DR MOHR) AND SAID THAT PATIENT NEEDS TO GET THESE DONE. PLEASE ADVISE.

## 2022-07-13 ENCOUNTER — OFFICE VISIT (OUTPATIENT)
Dept: FAMILY MEDICINE CLINIC | Facility: CLINIC | Age: 32
End: 2022-07-13

## 2022-07-13 VITALS
SYSTOLIC BLOOD PRESSURE: 128 MMHG | BODY MASS INDEX: 24.48 KG/M2 | WEIGHT: 133 LBS | HEIGHT: 62 IN | OXYGEN SATURATION: 99 % | HEART RATE: 67 BPM | DIASTOLIC BLOOD PRESSURE: 92 MMHG | RESPIRATION RATE: 18 BRPM

## 2022-07-13 DIAGNOSIS — Z13.228 SCREENING FOR METABOLIC DISORDER: ICD-10-CM

## 2022-07-13 DIAGNOSIS — Z00.00 ENCOUNTER FOR SCREENING AND PREVENTATIVE CARE: Primary | ICD-10-CM

## 2022-07-13 DIAGNOSIS — Z12.4 CERVICAL CANCER SCREENING: ICD-10-CM

## 2022-07-13 DIAGNOSIS — N89.8 VAGINAL DISCHARGE: ICD-10-CM

## 2022-07-13 DIAGNOSIS — Z11.59 ENCOUNTER FOR HEPATITIS C SCREENING TEST FOR LOW RISK PATIENT: ICD-10-CM

## 2022-07-13 DIAGNOSIS — Z13.220 SCREENING, LIPID: ICD-10-CM

## 2022-07-13 DIAGNOSIS — Z12.72 FOLLOW-UP VAGINAL PAP SMEAR: ICD-10-CM

## 2022-07-13 PROCEDURE — 99395 PREV VISIT EST AGE 18-39: CPT | Performed by: NURSE PRACTITIONER

## 2022-07-13 NOTE — PROGRESS NOTES
Subjective   Minor Hardin is a 31 y.o. female.     History of Present Illness   Established patient, new to this provider.   Due annual and papsmear today.     History of yeast infections and took OTC creams and suppositories and urgent care gave fluconazole.  She denies itching or pain today.  Scant vaginal discharge.  She is in monogamous relationship with her wife.    Here for annual exam and screening. Does not want to do labs today.    Patient had a recent bilateral breast reduction 7 months ago.  She reports some remaining numbness, both breasts are well healed, no pain no signs of infection.    The following portions of the patient's history were reviewed and updated as appropriate: allergies, current medications, past family history, past medical history, past social history, past surgical history and problem list.    Review of Systems   Constitutional: Negative for chills and fever.   HENT: Negative for sore throat.         Dental exam is due   Eyes: Negative for visual disturbance.   Respiratory: Negative for cough, shortness of breath and wheezing.    Cardiovascular: Negative for chest pain, palpitations and leg swelling.   Gastrointestinal: Negative for abdominal pain, constipation, diarrhea, nausea, vomiting and GERD.   Endocrine: Negative for polydipsia, polyphagia and polyuria.   Genitourinary: Positive for vaginal discharge. Negative for breast pain, dysuria, flank pain, frequency, hematuria, pelvic pain and urgency.   Musculoskeletal: Negative for back pain.   Skin: Negative for rash.   Allergic/Immunologic: Negative for environmental allergies.   Neurological: Negative for weakness and headache.   Hematological: Negative for adenopathy. Does not bruise/bleed easily.   Psychiatric/Behavioral: Negative for depressed mood. The patient is not nervous/anxious.        Objective   Physical Exam  Vitals reviewed. Exam conducted with a chaperone present.   Constitutional:       Appearance: Normal  appearance. She is normal weight.   HENT:      Head: Normocephalic.      Right Ear: Tympanic membrane normal.      Left Ear: Tympanic membrane normal.      Nose: Nose normal.      Mouth/Throat:      Mouth: Mucous membranes are moist.   Eyes:      Pupils: Pupils are equal, round, and reactive to light.   Cardiovascular:      Rate and Rhythm: Normal rate and regular rhythm.      Pulses: Normal pulses.      Heart sounds: Normal heart sounds.   Pulmonary:      Effort: Pulmonary effort is normal.      Breath sounds: Normal breath sounds.   Chest:      Chest wall: No mass, deformity, swelling or tenderness.   Breasts:      Right: Normal. No axillary adenopathy or supraclavicular adenopathy.      Left: Normal. No axillary adenopathy or supraclavicular adenopathy.         Abdominal:      General: Abdomen is flat. Bowel sounds are normal.      Palpations: Abdomen is soft.   Genitourinary:     General: Normal vulva.      Exam position: Lithotomy position.      Labia:         Right: No tenderness.         Left: No tenderness.       Vagina: Normal. Vaginal discharge: White, milky.      Cervix: Discharge (White, milky) present.      Uterus: Normal.       Adnexa: Right adnexa normal and left adnexa normal.   Musculoskeletal:         General: Normal range of motion.      Cervical back: Normal range of motion.   Lymphadenopathy:      Upper Body:      Right upper body: No supraclavicular, axillary or pectoral adenopathy.      Left upper body: No supraclavicular, axillary or pectoral adenopathy.      Lower Body: No right inguinal adenopathy. No left inguinal adenopathy.   Skin:     General: Skin is warm.   Neurological:      General: No focal deficit present.      Mental Status: She is alert.   Psychiatric:         Mood and Affect: Mood normal.         Vitals:    07/13/22 1323   BP: 128/92   Pulse: 67   Resp: 18   SpO2: 99%     Body mass index is 24.33 kg/m².    Procedures    Assessment & Plan   Problems Addressed this Visit     None     Visit Diagnoses     Encounter for screening and preventative care    -  Primary    Relevant Orders    CBC & Differential    Comprehensive metabolic panel    TSH    Screening for metabolic disorder        Relevant Orders    CBC & Differential    Comprehensive metabolic panel    TSH    Screening, lipid        Relevant Orders    Lipid panel    Encounter for hepatitis C screening test for low risk patient        Relevant Orders    Hepatitis C Antibody    Follow-up vaginal Pap smear        Vaginal discharge        Relevant Orders    NuSwab BV & Candida - Swab, Vagina    Cervical cancer screening        Relevant Orders    IGP, CtNg, Rfx Aptima HPV ASCU      Diagnoses       Codes Comments    Encounter for screening and preventative care    -  Primary ICD-10-CM: Z00.00  ICD-9-CM: V70.0     Screening for metabolic disorder     ICD-10-CM: Z13.228  ICD-9-CM: V77.99     Screening, lipid     ICD-10-CM: Z13.220  ICD-9-CM: V77.91     Encounter for hepatitis C screening test for low risk patient     ICD-10-CM: Z11.59  ICD-9-CM: V73.89     Follow-up vaginal Pap smear     ICD-10-CM: Z12.72  ICD-9-CM: V76.47     Vaginal discharge     ICD-10-CM: N89.8  ICD-9-CM: 623.5     Cervical cancer screening     ICD-10-CM: Z12.4  ICD-9-CM: V76.2         Orders Placed This Encounter   Procedures   • NuSwab BV & Candida - Swab, Vagina     Order Specific Question:   Release to patient     Answer:   Immediate   • Comprehensive metabolic panel     Standing Status:   Future     Standing Expiration Date:   7/13/2023     Order Specific Question:   Release to patient     Answer:   Immediate   • TSH     Standing Status:   Future     Standing Expiration Date:   7/13/2023     Order Specific Question:   Release to patient     Answer:   Immediate   • Hepatitis C Antibody     Standing Status:   Future     Standing Expiration Date:   7/13/2023     Order Specific Question:   Release to patient     Answer:   Immediate   • Lipid panel     Standing  Status:   Future     Standing Expiration Date:   7/13/2023     Order Specific Question:   Release to patient     Answer:   Immediate   • CBC & Differential     Standing Status:   Future     Standing Expiration Date:   7/13/2023   Preventative care- follow-up annually and as needed with provider for updated screening and management.  Follow heart healthy diet, walk daily, drink water.  Follow CDC guidelines for COVID pandemic.  Wear seatbelts and helmets.  Wear sunscreen and hats.    Pap smear collected with swab to check for BV and yeast- slight milky discharge, call for itching or worsening discharge.  Will await lab report.       Education provided in AVS   Return if symptoms worsen or fail to improve, for Annual.  Answers for HPI/ROS submitted by the patient on 7/6/2022  What is the primary reason for your visit?: Vaginal Discharge/Irritation

## 2022-07-15 LAB
A VAGINAE DNA VAG QL NAA+PROBE: NORMAL SCORE
BVAB2 DNA VAG QL NAA+PROBE: NORMAL SCORE
C ALBICANS DNA VAG QL NAA+PROBE: NEGATIVE
C GLABRATA DNA VAG QL NAA+PROBE: NEGATIVE
C TRACH RRNA CVX QL NAA+PROBE: NEGATIVE
CONV .: NORMAL
CYTOLOGIST CVX/VAG CYTO: NORMAL
CYTOLOGY CVX/VAG DOC CYTO: NORMAL
CYTOLOGY CVX/VAG DOC THIN PREP: NORMAL
DX ICD CODE: NORMAL
HIV 1 & 2 AB SER-IMP: NORMAL
MEGA1 DNA VAG QL NAA+PROBE: NORMAL SCORE
N GONORRHOEA RRNA CVX QL NAA+PROBE: NEGATIVE
OTHER STN SPEC: NORMAL
STAT OF ADQ CVX/VAG CYTO-IMP: NORMAL

## 2025-02-17 ENCOUNTER — OFFICE VISIT (OUTPATIENT)
Dept: FAMILY MEDICINE CLINIC | Facility: CLINIC | Age: 35
End: 2025-02-17
Payer: COMMERCIAL

## 2025-02-17 VITALS
SYSTOLIC BLOOD PRESSURE: 112 MMHG | HEART RATE: 58 BPM | BODY MASS INDEX: 23.55 KG/M2 | RESPIRATION RATE: 18 BRPM | OXYGEN SATURATION: 99 % | DIASTOLIC BLOOD PRESSURE: 70 MMHG | WEIGHT: 128 LBS | HEIGHT: 62 IN

## 2025-02-17 DIAGNOSIS — Z11.59 ENCOUNTER FOR HEPATITIS C SCREENING TEST FOR LOW RISK PATIENT: ICD-10-CM

## 2025-02-17 DIAGNOSIS — Z13.228 ENCOUNTER FOR SCREENING FOR METABOLIC DISORDER: ICD-10-CM

## 2025-02-17 DIAGNOSIS — Z00.00 ANNUAL PHYSICAL EXAM: Primary | ICD-10-CM

## 2025-02-17 DIAGNOSIS — Z13.220 SCREENING, LIPID: ICD-10-CM

## 2025-02-17 PROCEDURE — 90715 TDAP VACCINE 7 YRS/> IM: CPT | Performed by: NURSE PRACTITIONER

## 2025-02-17 PROCEDURE — 90471 IMMUNIZATION ADMIN: CPT | Performed by: NURSE PRACTITIONER

## 2025-02-17 PROCEDURE — 99395 PREV VISIT EST AGE 18-39: CPT | Performed by: NURSE PRACTITIONER

## 2025-02-17 NOTE — PROGRESS NOTES
Sangeetha Hardin is a 34 y.o. female.   Patient here for annual physical exam, labs, chronic illness management and updated screening.      History of Present Illness     The following portions of the patient's history were reviewed and updated as appropriate: allergies, current medications, past family history, past medical history, past social history, past surgical history and problem list.       The patient is a 34-year-old female who presents for an annual exam.    She has no significant medical history and does not require daily medication. She has a same-sex partner and has declined sexually transmitted disease testing. She has a regular bowel movement pattern and maintains a diet rich in fiber, fruits, and vegetables. She does not use corrective lenses or contact lenses. She does not require the use of an inhaler for wheezing. She reports no mental health issues such as depression or anxiety. She maintains regular dental check-ups. She has received the influenza vaccine and declines the COVID-19 vaccine. She has a history of breast reduction surgery and wisdom teeth extraction. She has no significant medical history. She has a same-sex partner and has declined sexually transmitted disease testing. She has a regular bowel movement pattern and maintains a diet rich in fiber, fruits, and vegetables. She does not use corrective lenses or contact lenses. She does not require the use of an inhaler for wheezing. She reports no mental health issues such as depression or anxiety. She maintains regular dental check-ups. She has received the influenza vaccine and declines the COVID-19 vaccine. She has a history of breast reduction surgery and wisdom teeth extraction.    She experiences mild allergies during seasonal changes, which resolve spontaneously. She does not take Zyrtec.    She has a history of skin tags on her neck, which have been previously removed. She does not currently see a  dermatologist.    SOCIAL HISTORY  She does not smoke, vape, consume alcohol, or use drugs. She has a 1.5-year-old son and is planning to relocate to [place] in 06/2025.    FAMILY HISTORY  Her father has cirrhosis. Her maternal grandmother had diabetes and stroke. Her paternal grandfather has cancer. Her paternal grandmother recently passed away, but the cause is unclear.    IMMUNIZATIONS  She has received the influenza vaccine. She declines the COVID-19 vaccine.       Review of Systems   Constitutional:  Negative for activity change, fatigue, unexpected weight gain and unexpected weight loss.   HENT:  Negative for congestion and postnasal drip.         Dental exam is utd      Eyes:  Negative for blurred vision, double vision and visual disturbance.   Respiratory:  Negative for cough, chest tightness, shortness of breath and wheezing.    Cardiovascular:  Negative for chest pain, palpitations and leg swelling.   Gastrointestinal:  Negative for abdominal pain, constipation, diarrhea and GERD.   Endocrine: Negative for cold intolerance, heat intolerance, polydipsia, polyphagia and polyuria.   Genitourinary:  Negative for breast lump, breast pain, dysuria and frequency.   Musculoskeletal:  Negative for arthralgias.   Skin:  Negative for rash.   Allergic/Immunologic: Negative for environmental allergies.   Neurological:  Negative for dizziness.   Hematological:  Does not bruise/bleed easily.   Psychiatric/Behavioral:  Negative for depressed mood and stress. The patient is not nervous/anxious.        No current outpatient medications on file.    Objective   Physical Exam  Vitals reviewed.   Constitutional:       Appearance: Normal appearance.   HENT:      Right Ear: Tympanic membrane normal.      Left Ear: Tympanic membrane normal.      Nose: Nose normal.      Mouth/Throat:      Mouth: Mucous membranes are moist.   Eyes:      Pupils: Pupils are equal, round, and reactive to light.   Cardiovascular:      Rate and Rhythm:  "Normal rate and regular rhythm.      Pulses: Normal pulses.   Pulmonary:      Effort: Pulmonary effort is normal.   Abdominal:      General: Abdomen is flat.   Musculoskeletal:         General: Normal range of motion.      Cervical back: Normal range of motion.   Skin:     General: Skin is warm.   Neurological:      General: No focal deficit present.      Mental Status: She is alert.         Vitals:    02/17/25 0848   BP: 112/70   Pulse: 58   Resp: 18   SpO2: 99%     Body mass index is 23.41 kg/m².    Procedures    No results found for: \"CMP\", \"BMP\", \"TSH\", \"CBC\", \"HGBA1C\", \"LIPIDINTERP\"         Over the past 2 weeks, how often have you been bothered by any of the following problems?  Little interest or pleasure in doing things: Not at all  Feeling down, depressed, or hopeless: Not at all      Assessment & Plan   Problems Addressed this Visit    None  Visit Diagnoses       Annual physical exam    -  Primary    Relevant Orders    Comprehensive Metabolic Panel    CBC & Differential    Lipid Panel With / Chol / HDL Ratio    TSH    Screening, lipid        Encounter for screening for metabolic disorder        Encounter for hepatitis C screening test for low risk patient        Relevant Orders    Hepatitis C Antibody          Diagnoses         Codes Comments    Annual physical exam    -  Primary ICD-10-CM: Z00.00  ICD-9-CM: V70.0     Screening, lipid     ICD-10-CM: Z13.220  ICD-9-CM: V77.91     Encounter for screening for metabolic disorder     ICD-10-CM: Z13.228  ICD-9-CM: V77.99     Encounter for hepatitis C screening test for low risk patient     ICD-10-CM: Z11.59  ICD-9-CM: V73.89           Orders Placed This Encounter   Procedures    Tdap Vaccine => 8yo IM (BOOSTRIX/ADACEL)    Comprehensive Metabolic Panel     Order Specific Question:   Release to patient     Answer:   Routine Release [[id number]]    Lipid Panel With / Chol / HDL Ratio     Order Specific Question:   Release to patient     Answer:   Routine Release " [[id number]]    TSH     Order Specific Question:   Release to patient     Answer:   Routine Release [[id number]]    Hepatitis C Antibody     Order Specific Question:   Release to patient     Answer:   Routine Release [[id number]]    CBC & Differential     Order Specific Question:   Release to patient     Answer:   Routine Release [[id number]]       Assessment & Plan  1. Health maintenance.  Her last Pap smear was conducted in 2022, indicating that the next one is due in the summer of 2025. She is at a low risk for HPV infection, given her history of same-sex partnerships and monogamous relationship status. A comprehensive blood work panel will be ordered today to establish baseline levels for cholesterol, kidney, and liver function. A hepatitis C screening will also be included in the blood work. She will receive a tetanus vaccine during this visit, which will provide immunity for the next 10 years. She has been advised to schedule a Pap smear appointment with a gynecologist upon her relocation to [place].    2. Seasonal allergies.  She has been advised to take over-the-counter antihistamines such as Zyrtec, Allegra, or Claritin as needed for symptom relief.        PROCEDURE  The patient has a history of breast reduction surgery and wisdom teeth extraction.      Preventative care- Follow heart healthy diet, drink water, walk daily. Wear seatbelts, wear helmets, wear sunscreens. Follow CDC guidelines for covid and flu .          Education provided in AVS   Return in about 1 year (around 2/17/2026) for Annual physical.    Patient or patient representative verbalized consent for the use of Ambient Listening during the visit with  NGOZI Tolbert for chart documentation. 2/17/2025  10:47 EST

## 2025-02-18 LAB
ALBUMIN SERPL-MCNC: 4.5 G/DL (ref 3.5–5.2)
ALBUMIN/GLOB SERPL: 1.5 G/DL
ALP SERPL-CCNC: 44 U/L (ref 39–117)
ALT SERPL-CCNC: 25 U/L (ref 1–33)
AST SERPL-CCNC: 32 U/L (ref 1–32)
BASOPHILS # BLD AUTO: 0.05 10*3/MM3 (ref 0–0.2)
BASOPHILS NFR BLD AUTO: 0.7 % (ref 0–1.5)
BILIRUB SERPL-MCNC: <0.2 MG/DL (ref 0–1.2)
BUN SERPL-MCNC: 8 MG/DL (ref 6–20)
BUN/CREAT SERPL: 13.6 (ref 7–25)
CALCIUM SERPL-MCNC: 9.6 MG/DL (ref 8.6–10.5)
CHLORIDE SERPL-SCNC: 104 MMOL/L (ref 98–107)
CHOLEST SERPL-MCNC: 163 MG/DL (ref 0–200)
CHOLEST/HDLC SERPL: 2.72 {RATIO}
CO2 SERPL-SCNC: 25.5 MMOL/L (ref 22–29)
CREAT SERPL-MCNC: 0.59 MG/DL (ref 0.57–1)
EGFRCR SERPLBLD CKD-EPI 2021: 122.2 ML/MIN/1.73
EOSINOPHIL # BLD AUTO: 0.11 10*3/MM3 (ref 0–0.4)
EOSINOPHIL NFR BLD AUTO: 1.6 % (ref 0.3–6.2)
ERYTHROCYTE [DISTWIDTH] IN BLOOD BY AUTOMATED COUNT: 12 % (ref 12.3–15.4)
GLOBULIN SER CALC-MCNC: 3.1 GM/DL
GLUCOSE SERPL-MCNC: 80 MG/DL (ref 65–99)
HCT VFR BLD AUTO: 43.5 % (ref 34–46.6)
HCV IGG SERPL QL IA: NON REACTIVE
HDLC SERPL-MCNC: 60 MG/DL (ref 40–60)
HGB BLD-MCNC: 14.7 G/DL (ref 12–15.9)
IMM GRANULOCYTES # BLD AUTO: 0.05 10*3/MM3 (ref 0–0.05)
IMM GRANULOCYTES NFR BLD AUTO: 0.7 % (ref 0–0.5)
LDLC SERPL CALC-MCNC: 90 MG/DL (ref 0–100)
LYMPHOCYTES # BLD AUTO: 2.01 10*3/MM3 (ref 0.7–3.1)
LYMPHOCYTES NFR BLD AUTO: 28.6 % (ref 19.6–45.3)
MCH RBC QN AUTO: 31.8 PG (ref 26.6–33)
MCHC RBC AUTO-ENTMCNC: 33.8 G/DL (ref 31.5–35.7)
MCV RBC AUTO: 94.2 FL (ref 79–97)
MONOCYTES # BLD AUTO: 0.58 10*3/MM3 (ref 0.1–0.9)
MONOCYTES NFR BLD AUTO: 8.3 % (ref 5–12)
NEUTROPHILS # BLD AUTO: 4.23 10*3/MM3 (ref 1.7–7)
NEUTROPHILS NFR BLD AUTO: 60.1 % (ref 42.7–76)
NRBC BLD AUTO-RTO: 0 /100 WBC (ref 0–0.2)
PLATELET # BLD AUTO: 266 10*3/MM3 (ref 140–450)
POTASSIUM SERPL-SCNC: 4.3 MMOL/L (ref 3.5–5.2)
PROT SERPL-MCNC: 7.6 G/DL (ref 6–8.5)
RBC # BLD AUTO: 4.62 10*6/MM3 (ref 3.77–5.28)
SODIUM SERPL-SCNC: 140 MMOL/L (ref 136–145)
TRIGL SERPL-MCNC: 66 MG/DL (ref 0–150)
TSH SERPL DL<=0.005 MIU/L-ACNC: 1.35 UIU/ML (ref 0.27–4.2)
VLDLC SERPL CALC-MCNC: 13 MG/DL (ref 5–40)
WBC # BLD AUTO: 7.03 10*3/MM3 (ref 3.4–10.8)

## (undated) DEVICE — EXTRCT STPL SKIN STRL BX/12

## (undated) DEVICE — COVERROLL STRETCH 10CMX1.84M WHITE 1: Brand: COVER-ROLL®

## (undated) DEVICE — PK UNIV COMPL 40

## (undated) DEVICE — TOTAL TRAY, 16FR 10ML SIL FOLEY, URN: Brand: MEDLINE

## (undated) DEVICE — NEEDLE, QUINCKE 22GX3.5": Brand: MEDLINE INDUSTRIES, INC.

## (undated) DEVICE — THERMOGARD PLUS ABC DUAL DISPERSIVE ELECTRODE: Brand: THERMOGARD

## (undated) DEVICE — STRIP CLS WND SUTURESTRIP/PLS 0.5X5IN TP1105

## (undated) DEVICE — BANDAGE,GAUZE,BULKEE II,4.5"X4.1YD,STRL: Brand: MEDLINE

## (undated) DEVICE — INTENDED FOR TISSUE SEPARATION, AND OTHER PROCEDURES THAT REQUIRE A SHARP SURGICAL BLADE TO PUNCTURE OR CUT.: Brand: BARD-PARKER ® CARBON RIB-BACK BLADES

## (undated) DEVICE — ELECTRD BLD EZ CLN MOD XLNG 2.75IN

## (undated) DEVICE — ANTIBACTERIAL UNDYED BRAIDED (POLYGLACTIN 910), SYNTHETIC ABSORBABLE SUTURE: Brand: COATED VICRYL

## (undated) DEVICE — PROXIMATE RH ROTATING HEAD SKIN STAPLERS (35 WIDE) CONTAINS 35 STAINLESS STEEL STAPLES: Brand: PROXIMATE

## (undated) DEVICE — ABC HANDPIECE SINGLE FUNCTION HANDPIECE: Brand: ABC

## (undated) DEVICE — ABC DISSECTING BLADE ELECTRODE, ELECTROSURGICAL ACCESSORY ELECTRODE: Brand: ABC

## (undated) DEVICE — IRRIGATOR BULB ASEPTO 60CC STRL

## (undated) DEVICE — PENCL E/S ULTRAVAC TELESCP NOSE HOLSTR 10FT

## (undated) DEVICE — SPNG GZ WOVN 4X4IN 12PLY 10/BX STRL

## (undated) DEVICE — BIOPATCH™ ANTIMICROBIAL DRESSING WITH CHLORHEXIDINE GLUCONATE IS A HYDROPHILLIC POLYURETHANE ABSORPTIVE FOAM WITH CHLORHEXIDINE GLUCONATE (CHG) WHICH INHIBITS BACTERIAL GROWTH UNDER THE DRESSING. THE DRESSING IS INTENDED TO BE USED TO ABSORB EXUDATE, COVER A WOUND CAUSED BY VASCULAR AND NONVASCULAR PERCUTANEOUS MEDICAL DEVICES DURING SURGERY, AS WELL AS REDUCE LOCAL INFECTION AND COLONIZATION OF MICROORGANISMS.: Brand: BIOPATCH

## (undated) DEVICE — APPL CHLORAPREP HI/LITE 26ML ORNG

## (undated) DEVICE — SUT VIC 0 CT1 36IN J946H

## (undated) DEVICE — ADHS LIQ MASTISOL 2/3ML

## (undated) DEVICE — ELECTRD NDL MEGADYNE EZCLEAN MEGAFINE 2IN

## (undated) DEVICE — JACKSON-PRATT 100CC BULB RESERVOIR: Brand: CARDINAL HEALTH

## (undated) DEVICE — SUT SILK 2/0 FS BLK 18IN 685G